# Patient Record
Sex: MALE | Race: WHITE | NOT HISPANIC OR LATINO | URBAN - METROPOLITAN AREA
[De-identification: names, ages, dates, MRNs, and addresses within clinical notes are randomized per-mention and may not be internally consistent; named-entity substitution may affect disease eponyms.]

---

## 2017-02-08 ENCOUNTER — EMERGENCY (EMERGENCY)
Facility: HOSPITAL | Age: 72
LOS: 0 days | Discharge: NURSING, REHAB OR LONG-TERM CARE | End: 2017-02-09

## 2017-03-16 ENCOUNTER — EMERGENCY (EMERGENCY)
Facility: HOSPITAL | Age: 72
LOS: 0 days | Discharge: NURSING, REHAB OR LONG-TERM CARE | End: 2017-03-16

## 2017-06-27 DIAGNOSIS — Z88.0 ALLERGY STATUS TO PENICILLIN: ICD-10-CM

## 2017-06-27 DIAGNOSIS — F32.9 MAJOR DEPRESSIVE DISORDER, SINGLE EPISODE, UNSPECIFIED: ICD-10-CM

## 2017-06-27 DIAGNOSIS — F20.9 SCHIZOPHRENIA, UNSPECIFIED: ICD-10-CM

## 2017-06-27 DIAGNOSIS — E78.00 PURE HYPERCHOLESTEROLEMIA, UNSPECIFIED: ICD-10-CM

## 2017-06-27 DIAGNOSIS — F22 DELUSIONAL DISORDERS: ICD-10-CM

## 2017-06-27 DIAGNOSIS — Z87.891 PERSONAL HISTORY OF NICOTINE DEPENDENCE: ICD-10-CM

## 2017-06-27 DIAGNOSIS — Z79.899 OTHER LONG TERM (CURRENT) DRUG THERAPY: ICD-10-CM

## 2017-06-27 DIAGNOSIS — K21.9 GASTRO-ESOPHAGEAL REFLUX DISEASE WITHOUT ESOPHAGITIS: ICD-10-CM

## 2017-07-18 ENCOUNTER — EMERGENCY (EMERGENCY)
Facility: HOSPITAL | Age: 72
LOS: 0 days | Discharge: NURSING, REHAB OR LONG-TERM CARE | End: 2017-07-18
Admitting: INTERNAL MEDICINE

## 2017-07-18 DIAGNOSIS — M25.551 PAIN IN RIGHT HIP: ICD-10-CM

## 2017-07-18 DIAGNOSIS — Z79.82 LONG TERM (CURRENT) USE OF ASPIRIN: ICD-10-CM

## 2017-07-18 DIAGNOSIS — F20.5 RESIDUAL SCHIZOPHRENIA: ICD-10-CM

## 2017-07-18 DIAGNOSIS — Z87.891 PERSONAL HISTORY OF NICOTINE DEPENDENCE: ICD-10-CM

## 2017-07-18 DIAGNOSIS — M25.511 PAIN IN RIGHT SHOULDER: ICD-10-CM

## 2017-07-18 DIAGNOSIS — K21.9 GASTRO-ESOPHAGEAL REFLUX DISEASE WITHOUT ESOPHAGITIS: ICD-10-CM
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
## 2017-07-18 DIAGNOSIS — Z88.0 ALLERGY STATUS TO PENICILLIN: ICD-10-CM
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
## 2017-07-18 DIAGNOSIS — L03.119 CELLULITIS OF UNSPECIFIED PART OF LIMB: ICD-10-CM
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
## 2017-07-18 DIAGNOSIS — E78.00 PURE HYPERCHOLESTEROLEMIA, UNSPECIFIED: ICD-10-CM

## 2017-07-18 DIAGNOSIS — G89.29 OTHER CHRONIC PAIN: ICD-10-CM

## 2017-07-18 DIAGNOSIS — F32.9 MAJOR DEPRESSIVE DISORDER, SINGLE EPISODE, UNSPECIFIED: ICD-10-CM

## 2017-08-05 ENCOUNTER — EMERGENCY (EMERGENCY)
Facility: HOSPITAL | Age: 72
LOS: 0 days | Discharge: HOME | End: 2017-08-05
Admitting: INTERNAL MEDICINE

## 2017-08-05 DIAGNOSIS — F20.5 RESIDUAL SCHIZOPHRENIA: ICD-10-CM

## 2017-08-05 DIAGNOSIS — L03.119 CELLULITIS OF UNSPECIFIED PART OF LIMB: ICD-10-CM

## 2017-08-05 DIAGNOSIS — Z02.9 ENCOUNTER FOR ADMINISTRATIVE EXAMINATIONS, UNSPECIFIED: ICD-10-CM

## 2017-08-06 ENCOUNTER — INPATIENT (INPATIENT)
Facility: HOSPITAL | Age: 72
LOS: 7 days | Discharge: NURSING, REHAB OR LONG-TERM CARE | End: 2017-08-14
Attending: INTERNAL MEDICINE | Admitting: INTERNAL MEDICINE

## 2017-08-06 DIAGNOSIS — L03.119 CELLULITIS OF UNSPECIFIED PART OF LIMB: ICD-10-CM

## 2017-08-06 DIAGNOSIS — F20.5 RESIDUAL SCHIZOPHRENIA: ICD-10-CM

## 2017-08-14 PROBLEM — Z00.00 ENCOUNTER FOR PREVENTIVE HEALTH EXAMINATION: Status: ACTIVE | Noted: 2017-08-14

## 2017-08-16 DIAGNOSIS — G40.802 OTHER EPILEPSY, NOT INTRACTABLE, WITHOUT STATUS EPILEPTICUS: ICD-10-CM

## 2017-08-16 DIAGNOSIS — F20.9 SCHIZOPHRENIA, UNSPECIFIED: ICD-10-CM

## 2017-08-16 DIAGNOSIS — J44.9 CHRONIC OBSTRUCTIVE PULMONARY DISEASE, UNSPECIFIED: ICD-10-CM

## 2017-08-16 DIAGNOSIS — E78.5 HYPERLIPIDEMIA, UNSPECIFIED: ICD-10-CM

## 2017-08-16 DIAGNOSIS — Z98.49 CATARACT EXTRACTION STATUS, UNSPECIFIED EYE: ICD-10-CM

## 2017-08-16 DIAGNOSIS — H40.9 UNSPECIFIED GLAUCOMA: ICD-10-CM

## 2017-08-16 DIAGNOSIS — Z88.0 ALLERGY STATUS TO PENICILLIN: ICD-10-CM

## 2017-08-16 DIAGNOSIS — K21.9 GASTRO-ESOPHAGEAL REFLUX DISEASE WITHOUT ESOPHAGITIS: ICD-10-CM

## 2017-08-16 DIAGNOSIS — J69.0 PNEUMONITIS DUE TO INHALATION OF FOOD AND VOMIT: ICD-10-CM

## 2017-08-16 DIAGNOSIS — E87.6 HYPOKALEMIA: ICD-10-CM

## 2017-08-16 DIAGNOSIS — J44.0 CHRONIC OBSTRUCTIVE PULMONARY DISEASE WITH ACUTE LOWER RESPIRATORY INFECTION: ICD-10-CM

## 2017-08-16 DIAGNOSIS — Z88.6 ALLERGY STATUS TO ANALGESIC AGENT: ICD-10-CM

## 2017-08-16 DIAGNOSIS — N32.81 OVERACTIVE BLADDER: ICD-10-CM

## 2017-08-16 DIAGNOSIS — R27.0 ATAXIA, UNSPECIFIED: ICD-10-CM

## 2017-08-16 DIAGNOSIS — Z72.0 TOBACCO USE: ICD-10-CM

## 2017-08-16 DIAGNOSIS — Z51.5 ENCOUNTER FOR PALLIATIVE CARE: ICD-10-CM

## 2017-08-16 DIAGNOSIS — Z88.8 ALLERGY STATUS TO OTHER DRUGS, MEDICAMENTS AND BIOLOGICAL SUBSTANCES STATUS: ICD-10-CM

## 2017-08-29 DIAGNOSIS — J44.0 CHRONIC OBSTRUCTIVE PULMONARY DISEASE WITH (ACUTE) LOWER RESPIRATORY INFECTION: ICD-10-CM

## 2017-08-29 DIAGNOSIS — J44.1 CHRONIC OBSTRUCTIVE PULMONARY DISEASE WITH (ACUTE) EXACERBATION: ICD-10-CM

## 2017-08-30 ENCOUNTER — EMERGENCY (EMERGENCY)
Facility: HOSPITAL | Age: 72
LOS: 0 days | Discharge: NURSING, REHAB OR LONG-TERM CARE | End: 2017-08-30
Admitting: INTERNAL MEDICINE

## 2017-08-30 DIAGNOSIS — F20.9 SCHIZOPHRENIA, UNSPECIFIED: ICD-10-CM

## 2017-08-30 DIAGNOSIS — R60.0 LOCALIZED EDEMA: ICD-10-CM

## 2017-08-30 DIAGNOSIS — I10 ESSENTIAL (PRIMARY) HYPERTENSION: ICD-10-CM

## 2017-08-30 DIAGNOSIS — F32.9 MAJOR DEPRESSIVE DISORDER, SINGLE EPISODE, UNSPECIFIED: ICD-10-CM

## 2017-08-30 DIAGNOSIS — Z88.0 ALLERGY STATUS TO PENICILLIN: ICD-10-CM

## 2017-08-30 DIAGNOSIS — F20.5 RESIDUAL SCHIZOPHRENIA: ICD-10-CM

## 2017-08-30 DIAGNOSIS — R56.9 UNSPECIFIED CONVULSIONS: ICD-10-CM

## 2017-08-30 DIAGNOSIS — Z79.899 OTHER LONG TERM (CURRENT) DRUG THERAPY: ICD-10-CM

## 2017-08-30 DIAGNOSIS — E78.00 PURE HYPERCHOLESTEROLEMIA, UNSPECIFIED: ICD-10-CM

## 2017-08-30 DIAGNOSIS — G40.909 EPILEPSY, UNSPECIFIED, NOT INTRACTABLE, WITHOUT STATUS EPILEPTICUS: ICD-10-CM

## 2017-08-30 DIAGNOSIS — N40.0 BENIGN PROSTATIC HYPERPLASIA WITHOUT LOWER URINARY TRACT SYMPTOMS: ICD-10-CM

## 2017-08-30 DIAGNOSIS — K21.9 GASTRO-ESOPHAGEAL REFLUX DISEASE WITHOUT ESOPHAGITIS: ICD-10-CM

## 2017-08-30 DIAGNOSIS — J45.909 UNSPECIFIED ASTHMA, UNCOMPLICATED: ICD-10-CM

## 2017-08-30 DIAGNOSIS — Z79.82 LONG TERM (CURRENT) USE OF ASPIRIN: ICD-10-CM

## 2017-08-30 DIAGNOSIS — L03.119 CELLULITIS OF UNSPECIFIED PART OF LIMB: ICD-10-CM

## 2017-08-30 DIAGNOSIS — R45.1 RESTLESSNESS AND AGITATION: ICD-10-CM

## 2017-10-06 ENCOUNTER — EMERGENCY (EMERGENCY)
Facility: HOSPITAL | Age: 72
LOS: 0 days | Discharge: NURSING, REHAB OR LONG-TERM CARE | End: 2017-10-06
Admitting: INTERNAL MEDICINE

## 2017-10-06 DIAGNOSIS — F32.9 MAJOR DEPRESSIVE DISORDER, SINGLE EPISODE, UNSPECIFIED: ICD-10-CM

## 2017-10-06 DIAGNOSIS — F20.5 RESIDUAL SCHIZOPHRENIA: ICD-10-CM

## 2017-10-06 DIAGNOSIS — M16.11 UNILATERAL PRIMARY OSTEOARTHRITIS, RIGHT HIP: ICD-10-CM

## 2017-10-06 DIAGNOSIS — Z79.82 LONG TERM (CURRENT) USE OF ASPIRIN: ICD-10-CM

## 2017-10-06 DIAGNOSIS — E78.00 PURE HYPERCHOLESTEROLEMIA, UNSPECIFIED: ICD-10-CM

## 2017-10-06 DIAGNOSIS — L03.119 CELLULITIS OF UNSPECIFIED PART OF LIMB: ICD-10-CM

## 2017-10-06 DIAGNOSIS — Z79.891 LONG TERM (CURRENT) USE OF OPIATE ANALGESIC: ICD-10-CM

## 2017-10-06 DIAGNOSIS — Z79.899 OTHER LONG TERM (CURRENT) DRUG THERAPY: ICD-10-CM

## 2017-10-06 DIAGNOSIS — K21.9 GASTRO-ESOPHAGEAL REFLUX DISEASE WITHOUT ESOPHAGITIS: ICD-10-CM

## 2017-10-06 DIAGNOSIS — Z88.0 ALLERGY STATUS TO PENICILLIN: ICD-10-CM

## 2017-10-06 DIAGNOSIS — M79.604 PAIN IN RIGHT LEG: ICD-10-CM

## 2017-12-21 ENCOUNTER — EMERGENCY (EMERGENCY)
Facility: HOSPITAL | Age: 72
LOS: 0 days | Discharge: NURSING, REHAB OR LONG-TERM CARE | End: 2017-12-21

## 2017-12-21 DIAGNOSIS — Z79.899 OTHER LONG TERM (CURRENT) DRUG THERAPY: ICD-10-CM

## 2017-12-21 DIAGNOSIS — J45.909 UNSPECIFIED ASTHMA, UNCOMPLICATED: ICD-10-CM

## 2017-12-21 DIAGNOSIS — K21.9 GASTRO-ESOPHAGEAL REFLUX DISEASE WITHOUT ESOPHAGITIS: ICD-10-CM

## 2017-12-21 DIAGNOSIS — Z79.82 LONG TERM (CURRENT) USE OF ASPIRIN: ICD-10-CM

## 2017-12-21 DIAGNOSIS — I10 ESSENTIAL (PRIMARY) HYPERTENSION: ICD-10-CM

## 2017-12-21 DIAGNOSIS — Z87.891 PERSONAL HISTORY OF NICOTINE DEPENDENCE: ICD-10-CM

## 2017-12-21 DIAGNOSIS — R45.1 RESTLESSNESS AND AGITATION: ICD-10-CM

## 2017-12-21 DIAGNOSIS — F20.5 RESIDUAL SCHIZOPHRENIA: ICD-10-CM

## 2017-12-21 DIAGNOSIS — E78.00 PURE HYPERCHOLESTEROLEMIA, UNSPECIFIED: ICD-10-CM

## 2017-12-21 DIAGNOSIS — F41.8 OTHER SPECIFIED ANXIETY DISORDERS: ICD-10-CM

## 2017-12-21 DIAGNOSIS — N40.0 BENIGN PROSTATIC HYPERPLASIA WITHOUT LOWER URINARY TRACT SYMPTOMS: ICD-10-CM

## 2017-12-21 DIAGNOSIS — L03.119 CELLULITIS OF UNSPECIFIED PART OF LIMB: ICD-10-CM

## 2017-12-21 DIAGNOSIS — Z88.0 ALLERGY STATUS TO PENICILLIN: ICD-10-CM

## 2018-02-07 ENCOUNTER — EMERGENCY (EMERGENCY)
Facility: HOSPITAL | Age: 73
LOS: 0 days | Discharge: NURSING, REHAB OR LONG-TERM CARE | End: 2018-02-07
Attending: EMERGENCY MEDICINE | Admitting: INTERNAL MEDICINE

## 2018-02-07 VITALS
DIASTOLIC BLOOD PRESSURE: 84 MMHG | SYSTOLIC BLOOD PRESSURE: 145 MMHG | TEMPERATURE: 96 F | HEART RATE: 77 BPM | RESPIRATION RATE: 17 BRPM | OXYGEN SATURATION: 98 %

## 2018-02-07 VITALS
HEART RATE: 77 BPM | OXYGEN SATURATION: 99 % | SYSTOLIC BLOOD PRESSURE: 151 MMHG | WEIGHT: 151.02 LBS | RESPIRATION RATE: 18 BRPM | DIASTOLIC BLOOD PRESSURE: 80 MMHG | TEMPERATURE: 98 F

## 2018-02-07 DIAGNOSIS — Z88.8 ALLERGY STATUS TO OTHER DRUGS, MEDICAMENTS AND BIOLOGICAL SUBSTANCES STATUS: ICD-10-CM

## 2018-02-07 DIAGNOSIS — Z88.6 ALLERGY STATUS TO ANALGESIC AGENT: ICD-10-CM
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
## 2018-02-07 DIAGNOSIS — Z88.0 ALLERGY STATUS TO PENICILLIN: ICD-10-CM

## 2018-02-07 DIAGNOSIS — M79.651 PAIN IN RIGHT THIGH: ICD-10-CM
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
## 2018-02-07 DIAGNOSIS — F17.210 NICOTINE DEPENDENCE, CIGARETTES, UNCOMPLICATED: ICD-10-CM
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
## 2018-02-07 NOTE — ED PROVIDER NOTE - PHYSICAL EXAMINATION
Physical Exam    Vital Signs: I have reviewed the initial vital signs.  Constitutional: well-nourished, appears stated age, no acute distress  Skin: warm, dry, no rash, redness or ecchymosis  Muskuloskeletal: +pain to palpation right thigh. NT to hip or knee. Patient ambulatory in ED. No swelling noted  Neuro: AO, Motor 5/5, Sensation: equal and intact
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
## 2018-02-07 NOTE — ED ADULT TRIAGE NOTE - CHIEF COMPLAINT QUOTE
Jose Manuel HESS from [facility]'s [facility] for right knee pain

## 2018-02-07 NOTE — ED ADULT NURSE NOTE - CHIEF COMPLAINT QUOTE
Jose Manuel HESS from [facility]'s [facility] for right knee pain

## 2018-07-04 ENCOUNTER — INPATIENT (INPATIENT)
Facility: HOSPITAL | Age: 73
LOS: 5 days | Discharge: NURSING, REHAB OR LONG-TERM CARE | End: 2018-07-10
Attending: INTERNAL MEDICINE | Admitting: INTERNAL MEDICINE

## 2018-07-04 VITALS
HEART RATE: 89 BPM | WEIGHT: 147.93 LBS | DIASTOLIC BLOOD PRESSURE: 76 MMHG | RESPIRATION RATE: 20 BRPM | OXYGEN SATURATION: 96 % | SYSTOLIC BLOOD PRESSURE: 137 MMHG

## 2018-07-04 LAB
ALBUMIN SERPL ELPH-MCNC: 4.7 G/DL — SIGNIFICANT CHANGE UP (ref 3.5–5.2)
ALP SERPL-CCNC: 171 U/L — HIGH (ref 30–115)
ALT FLD-CCNC: 12 U/L — SIGNIFICANT CHANGE UP (ref 0–41)
AMMONIA BLD-MCNC: 29 UMOL/L — SIGNIFICANT CHANGE UP (ref 11–55)
ANION GAP SERPL CALC-SCNC: 17 MMOL/L — HIGH (ref 7–14)
APPEARANCE UR: CLEAR — SIGNIFICANT CHANGE UP
APTT BLD: 29.1 SEC — SIGNIFICANT CHANGE UP (ref 27–39.2)
AST SERPL-CCNC: 23 U/L — SIGNIFICANT CHANGE UP (ref 0–41)
BASOPHILS # BLD AUTO: 0.03 K/UL — SIGNIFICANT CHANGE UP (ref 0–0.2)
BASOPHILS NFR BLD AUTO: 0.5 % — SIGNIFICANT CHANGE UP (ref 0–1)
BILIRUB SERPL-MCNC: 0.5 MG/DL — SIGNIFICANT CHANGE UP (ref 0.2–1.2)
BILIRUB UR-MCNC: NEGATIVE — SIGNIFICANT CHANGE UP
BUN SERPL-MCNC: 27 MG/DL — HIGH (ref 10–20)
CALCIUM SERPL-MCNC: 9.6 MG/DL — SIGNIFICANT CHANGE UP (ref 8.5–10.1)
CHLORIDE SERPL-SCNC: 100 MMOL/L — SIGNIFICANT CHANGE UP (ref 98–110)
CK SERPL-CCNC: 193 U/L — SIGNIFICANT CHANGE UP (ref 0–225)
CO2 SERPL-SCNC: 23 MMOL/L — SIGNIFICANT CHANGE UP (ref 17–32)
COLOR SPEC: YELLOW — SIGNIFICANT CHANGE UP
CREAT SERPL-MCNC: 1 MG/DL — SIGNIFICANT CHANGE UP (ref 0.7–1.5)
DIFF PNL FLD: (no result)
EOSINOPHIL # BLD AUTO: 0.06 K/UL — SIGNIFICANT CHANGE UP (ref 0–0.7)
EOSINOPHIL NFR BLD AUTO: 1 % — SIGNIFICANT CHANGE UP (ref 0–8)
GLUCOSE SERPL-MCNC: 84 MG/DL — SIGNIFICANT CHANGE UP (ref 70–99)
GLUCOSE UR QL: NEGATIVE — SIGNIFICANT CHANGE UP
HCT VFR BLD CALC: 39.8 % — LOW (ref 42–52)
HGB BLD-MCNC: 13.1 G/DL — LOW (ref 14–18)
IMM GRANULOCYTES NFR BLD AUTO: 0.2 % — SIGNIFICANT CHANGE UP (ref 0.1–0.3)
INR BLD: 1.24 RATIO — SIGNIFICANT CHANGE UP (ref 0.65–1.3)
KETONES UR-MCNC: 80 — SIGNIFICANT CHANGE UP
LACTATE SERPL-SCNC: 1.5 MMOL/L — SIGNIFICANT CHANGE UP (ref 0.5–2.2)
LEUKOCYTE ESTERASE UR-ACNC: NEGATIVE — SIGNIFICANT CHANGE UP
LYMPHOCYTES # BLD AUTO: 1.94 K/UL — SIGNIFICANT CHANGE UP (ref 1.2–3.4)
LYMPHOCYTES # BLD AUTO: 33.5 % — SIGNIFICANT CHANGE UP (ref 20.5–51.1)
MAGNESIUM SERPL-MCNC: 2.2 MG/DL — SIGNIFICANT CHANGE UP (ref 1.8–2.4)
MCHC RBC-ENTMCNC: 29.3 PG — SIGNIFICANT CHANGE UP (ref 27–31)
MCHC RBC-ENTMCNC: 32.9 G/DL — SIGNIFICANT CHANGE UP (ref 32–37)
MCV RBC AUTO: 89 FL — SIGNIFICANT CHANGE UP (ref 80–94)
MONOCYTES # BLD AUTO: 0.49 K/UL — SIGNIFICANT CHANGE UP (ref 0.1–0.6)
MONOCYTES NFR BLD AUTO: 8.5 % — SIGNIFICANT CHANGE UP (ref 1.7–9.3)
NEUTROPHILS # BLD AUTO: 3.26 K/UL — SIGNIFICANT CHANGE UP (ref 1.4–6.5)
NEUTROPHILS NFR BLD AUTO: 56.3 % — SIGNIFICANT CHANGE UP (ref 42.2–75.2)
NITRITE UR-MCNC: NEGATIVE — SIGNIFICANT CHANGE UP
NRBC # BLD: 0 /100 WBCS — SIGNIFICANT CHANGE UP (ref 0–0)
PH UR: 5.5 — SIGNIFICANT CHANGE UP (ref 5–8)
PLATELET # BLD AUTO: 229 K/UL — SIGNIFICANT CHANGE UP (ref 130–400)
POTASSIUM SERPL-MCNC: 4.4 MMOL/L — SIGNIFICANT CHANGE UP (ref 3.5–5)
POTASSIUM SERPL-SCNC: 4.4 MMOL/L — SIGNIFICANT CHANGE UP (ref 3.5–5)
PROT SERPL-MCNC: 8.1 G/DL — HIGH (ref 6–8)
PROT UR-MCNC: NEGATIVE — SIGNIFICANT CHANGE UP
PROTHROM AB SERPL-ACNC: 13.4 SEC — HIGH (ref 9.95–12.87)
RBC # BLD: 4.47 M/UL — LOW (ref 4.7–6.1)
RBC # FLD: 14.7 % — HIGH (ref 11.5–14.5)
RBC CASTS # UR COMP ASSIST: SIGNIFICANT CHANGE UP /HPF
SODIUM SERPL-SCNC: 140 MMOL/L — SIGNIFICANT CHANGE UP (ref 135–146)
SP GR SPEC: >=1.03 (ref 1.01–1.03)
TROPONIN T SERPL-MCNC: <0.01 NG/ML — SIGNIFICANT CHANGE UP
UROBILINOGEN FLD QL: 0.2 — SIGNIFICANT CHANGE UP (ref 0.2–0.2)
WBC # BLD: 5.79 K/UL — SIGNIFICANT CHANGE UP (ref 4.8–10.8)
WBC # FLD AUTO: 5.79 K/UL — SIGNIFICANT CHANGE UP (ref 4.8–10.8)
WBC UR QL: SIGNIFICANT CHANGE UP /HPF

## 2018-07-04 RX ORDER — LEVETIRACETAM 250 MG/1
1 TABLET, FILM COATED ORAL
Qty: 0 | Refills: 0 | COMMUNITY

## 2018-07-04 RX ORDER — TAMSULOSIN HYDROCHLORIDE 0.4 MG/1
0.4 CAPSULE ORAL AT BEDTIME
Qty: 0 | Refills: 0 | Status: DISCONTINUED | OUTPATIENT
Start: 2018-07-04 | End: 2018-07-10

## 2018-07-04 RX ORDER — MIRTAZAPINE 45 MG/1
1 TABLET, ORALLY DISINTEGRATING ORAL
Qty: 0 | Refills: 0 | COMMUNITY

## 2018-07-04 RX ORDER — TAMSULOSIN HYDROCHLORIDE 0.4 MG/1
1 CAPSULE ORAL
Qty: 0 | Refills: 0 | COMMUNITY

## 2018-07-04 RX ORDER — HALOPERIDOL DECANOATE 100 MG/ML
5 INJECTION INTRAMUSCULAR ONCE
Qty: 0 | Refills: 0 | Status: COMPLETED | OUTPATIENT
Start: 2018-07-04 | End: 2018-07-04

## 2018-07-04 RX ORDER — DIVALPROEX SODIUM 500 MG/1
500 TABLET, DELAYED RELEASE ORAL DAILY
Qty: 0 | Refills: 0 | Status: DISCONTINUED | OUTPATIENT
Start: 2018-07-04 | End: 2018-07-10

## 2018-07-04 RX ORDER — LEVETIRACETAM 250 MG/1
500 TABLET, FILM COATED ORAL
Qty: 0 | Refills: 0 | Status: DISCONTINUED | OUTPATIENT
Start: 2018-07-04 | End: 2018-07-10

## 2018-07-04 RX ORDER — DIVALPROEX SODIUM 500 MG/1
0 TABLET, DELAYED RELEASE ORAL
Qty: 0 | Refills: 0 | COMMUNITY

## 2018-07-04 RX ORDER — SODIUM CHLORIDE 9 MG/ML
3 INJECTION INTRAMUSCULAR; INTRAVENOUS; SUBCUTANEOUS EVERY 8 HOURS
Qty: 0 | Refills: 0 | Status: DISCONTINUED | OUTPATIENT
Start: 2018-07-04 | End: 2018-07-10

## 2018-07-04 RX ORDER — ACETAMINOPHEN 500 MG
650 TABLET ORAL ONCE
Qty: 0 | Refills: 0 | Status: DISCONTINUED | OUTPATIENT
Start: 2018-07-04 | End: 2018-07-04

## 2018-07-04 RX ORDER — ATORVASTATIN CALCIUM 80 MG/1
20 TABLET, FILM COATED ORAL AT BEDTIME
Qty: 0 | Refills: 0 | Status: DISCONTINUED | OUTPATIENT
Start: 2018-07-04 | End: 2018-07-10

## 2018-07-04 RX ORDER — ATORVASTATIN CALCIUM 80 MG/1
1 TABLET, FILM COATED ORAL
Qty: 0 | Refills: 0 | COMMUNITY

## 2018-07-04 RX ORDER — HALOPERIDOL DECANOATE 100 MG/ML
0 INJECTION INTRAMUSCULAR
Qty: 0 | Refills: 0 | COMMUNITY

## 2018-07-04 RX ORDER — PREGABALIN 225 MG/1
1000 CAPSULE ORAL ONCE
Qty: 0 | Refills: 0 | Status: COMPLETED | OUTPATIENT
Start: 2018-07-04 | End: 2018-07-04

## 2018-07-04 RX ORDER — PREGABALIN 225 MG/1
1000 CAPSULE ORAL ONCE
Qty: 0 | Refills: 0 | Status: DISCONTINUED | OUTPATIENT
Start: 2018-07-04 | End: 2018-07-04

## 2018-07-04 RX ORDER — MIRTAZAPINE 45 MG/1
15 TABLET, ORALLY DISINTEGRATING ORAL AT BEDTIME
Qty: 0 | Refills: 0 | Status: DISCONTINUED | OUTPATIENT
Start: 2018-07-04 | End: 2018-07-10

## 2018-07-04 RX ADMIN — TAMSULOSIN HYDROCHLORIDE 0.4 MILLIGRAM(S): 0.4 CAPSULE ORAL at 21:20

## 2018-07-04 RX ADMIN — MIRTAZAPINE 15 MILLIGRAM(S): 45 TABLET, ORALLY DISINTEGRATING ORAL at 21:20

## 2018-07-04 RX ADMIN — LEVETIRACETAM 500 MILLIGRAM(S): 250 TABLET, FILM COATED ORAL at 17:52

## 2018-07-04 RX ADMIN — PREGABALIN 1000 MICROGRAM(S): 225 CAPSULE ORAL at 16:41

## 2018-07-04 RX ADMIN — SODIUM CHLORIDE 3 MILLILITER(S): 9 INJECTION INTRAMUSCULAR; INTRAVENOUS; SUBCUTANEOUS at 21:19

## 2018-07-04 RX ADMIN — SODIUM CHLORIDE 3 MILLILITER(S): 9 INJECTION INTRAMUSCULAR; INTRAVENOUS; SUBCUTANEOUS at 17:05

## 2018-07-04 RX ADMIN — ATORVASTATIN CALCIUM 20 MILLIGRAM(S): 80 TABLET, FILM COATED ORAL at 21:20

## 2018-07-04 RX ADMIN — HALOPERIDOL DECANOATE 5 MILLIGRAM(S): 100 INJECTION INTRAMUSCULAR at 09:28

## 2018-07-04 NOTE — H&P ADULT - HISTORY OF PRESENT ILLNESS
73y old male pmhx below presents to the ED as per adult home for sudden onset of change in mental status. pt was found awake all night, walking about and confused. pt is confused unable to get further history.

## 2018-07-04 NOTE — H&P ADULT - NSHPPHYSICALEXAM_GEN_ALL_CORE
PHYSICAL EXAM:  GENERAL: alert, confused  HEAD:  Atraumatic, Normocephalic  EYES: EOMI, PERRLA, conjunctiva and sclera clear  NECK: Supple, No JVD  CHEST/LUNG: Clear to auscultation bilaterally  HEART: S1,S2 Regular rate and rhythm  ABDOMEN: Soft, nontender, nondistended, Bowel sounds present and normoactive  EXTREMITIES:  2+ peripheral pulses bilaterally and symmetrically, no clubbing, cyanosis, or edema  PSYCH: ALERT, confused  NEUROLOGY: non-focal, muscle strength 5/5 all extremities  SKIN: No rashes or lesions

## 2018-07-04 NOTE — ED ADULT NURSE NOTE - CHIEF COMPLAINT QUOTE
Pt from [facility] last night was walking aroundnaked and had jibberish speech. Pt usually speaks normally

## 2018-07-04 NOTE — ED ADULT TRIAGE NOTE - CHIEF COMPLAINT QUOTE
Pt from [facility] last night was walking aroundnaked and had jibberish speech. Pt usually speaks normally

## 2018-07-04 NOTE — ED PROVIDER NOTE - ATTENDING CONTRIBUTION TO CARE
73yr old male hx of schizophrenia, epilepsy, glaucoma, b12def, here for eval of chance in mental status. pt is resident of [facility], normally independently functional, noticed last night to be walking around naked, muttering incoherently. additionally as per [facility], yesterday by pt roomate noted not to be himself. pt on exam follows commands, vocalizing sounds not words, pt moves all ext with equal strength, right eye reative, left eye irregular pupil, poorly reactive (likely from cataract), neck supple, s1s2 ctab soft nt/nd. impression change in functional status, afebrile, no wbc, neck supplem, following commands, pt became more calm with haldol, seen by psych. plan to discuss with neuro admit to hospital for further workup and eval 73yr old male hx of schizophrenia, epilepsy, glaucoma, b12def, here for eval of chance in mental status. pt is resident of [facility], normally independently functional, noticed last night to be walking around naked, muttering incoherently. additionally as per [facility], yesterday by pt roomate noted not to be himself. pt on exam follows commands, vocalizing sounds not words, pt moves all ext with equal strength, right eye reative, left eye irregular pupil, poorly reactive (likely from cataract), neck supple, s1s2 ctab soft nt/nd. impression change in functional status, afebrile, no wbc, neck supple, following commands, pt became more calm with haldol, seen by psych. plan to discuss with neuro admit to hospital for further workup and eval

## 2018-07-04 NOTE — ED ADULT NURSE NOTE - PMH
Asthma    BPH (benign prostatic hyperplasia)    Constipation    COPD (chronic obstructive pulmonary disease)    Glaucoma    Hypertension    Overactive bladder    Schizophrenia    Seizure

## 2018-07-04 NOTE — H&P ADULT - NSHPLABSRESULTS_GEN_ALL_CORE
13.1   5.79  )-----------( 229      ( 04 Jul 2018 09:55 )             39.8       07-04    140  |  100  |  27<H>  ----------------------------<  84  4.4   |  23  |  1.0    Ca    9.6      04 Jul 2018 09:55  Mg     2.2     07-04    TPro  8.1<H>  /  Alb  4.7  /  TBili  0.5  /  DBili  x   /  AST  23  /  ALT  12  /  AlkPhos  171<H>  07-04          Magnesium, Serum: 2.2 mg/dL (07-04-18 @ 09:55)          Urinalysis Basic - ( 04 Jul 2018 12:17 )    Color: Yellow / Appearance: Clear / SG: >=1.030 / pH: x  Gluc: x / Ketone: 80  / Bili: Negative / Urobili: 0.2   Blood: x / Protein: Negative / Nitrite: Negative   Leuk Esterase: Negative / RBC: 1-2 /HPF / WBC 1-2 /HPF   Sq Epi: x / Non Sq Epi: x / Bacteria: x        PT/INR - ( 04 Jul 2018 09:55 )   PT: 13.40 sec;   INR: 1.24 ratio         PTT - ( 04 Jul 2018 09:55 )  PTT:29.1 sec    Lactate Trend  07-04 @ 09:56 Lactate:1.5       CARDIAC MARKERS ( 04 Jul 2018 09:55 )  x     / <0.01 ng/mL / 193 U/L / x     / 4.0 ng/mL

## 2018-07-04 NOTE — H&P ADULT - ATTENDING COMMENTS
Pt seen and examined independently, I have read and agree with above exam and poa,   PT is confused combative, aggressive and is cursing the staff    Physical Exam:  Physical Exam: PHYSICAL EXAM:  	GENERAL: alert, confused  	HEAD:  Atraumatic, Normocephalic  	EYES: EOMI, PERRLA, conjunctiva and sclera clear  	NECK: Supple, No JVD  	CHEST/LUNG: Clear to auscultation bilaterally  	HEART: S1,S2 Regular rate and rhythm  	ABDOMEN: Soft, nontender, nondistended, Bowel sounds present and normoactive  	EXTREMITIES:  2+ peripheral pulses bilaterally and symmetrically, no clubbing, cyanosis, or edema  	PSYCH: ALERT, confused  	NEUROLOGY: non-focal, muscle strength 5/5 all extremities confused,  SKIN: No rashes or lesions    · Assessment    DX; altred mental status/ metabolic encephalopathy  A/P; admit to med-surg  head CT  neurology consult  neuro checks  labs/ecg/c-xray  psychiatry consult  continue previous meds  seizure precautions  monitor vss  monitor p

## 2018-07-05 LAB
ANION GAP SERPL CALC-SCNC: 17 MMOL/L — HIGH (ref 7–14)
BUN SERPL-MCNC: 19 MG/DL — SIGNIFICANT CHANGE UP (ref 10–20)
CALCIUM SERPL-MCNC: 9.4 MG/DL — SIGNIFICANT CHANGE UP (ref 8.5–10.1)
CHLORIDE SERPL-SCNC: 101 MMOL/L — SIGNIFICANT CHANGE UP (ref 98–110)
CO2 SERPL-SCNC: 23 MMOL/L — SIGNIFICANT CHANGE UP (ref 17–32)
CREAT SERPL-MCNC: 0.9 MG/DL — SIGNIFICANT CHANGE UP (ref 0.7–1.5)
CULTURE RESULTS: NO GROWTH — SIGNIFICANT CHANGE UP
GLUCOSE SERPL-MCNC: 79 MG/DL — SIGNIFICANT CHANGE UP (ref 70–99)
HCT VFR BLD CALC: 43.2 % — SIGNIFICANT CHANGE UP (ref 42–52)
HGB BLD-MCNC: 14.1 G/DL — SIGNIFICANT CHANGE UP (ref 14–18)
MCHC RBC-ENTMCNC: 29 PG — SIGNIFICANT CHANGE UP (ref 27–31)
MCHC RBC-ENTMCNC: 32.6 G/DL — SIGNIFICANT CHANGE UP (ref 32–37)
MCV RBC AUTO: 88.9 FL — SIGNIFICANT CHANGE UP (ref 80–94)
NRBC # BLD: 0 /100 WBCS — SIGNIFICANT CHANGE UP (ref 0–0)
PLATELET # BLD AUTO: 213 K/UL — SIGNIFICANT CHANGE UP (ref 130–400)
POTASSIUM SERPL-MCNC: 4.7 MMOL/L — SIGNIFICANT CHANGE UP (ref 3.5–5)
POTASSIUM SERPL-SCNC: 4.7 MMOL/L — SIGNIFICANT CHANGE UP (ref 3.5–5)
RBC # BLD: 4.86 M/UL — SIGNIFICANT CHANGE UP (ref 4.7–6.1)
RBC # FLD: 14.4 % — SIGNIFICANT CHANGE UP (ref 11.5–14.5)
SODIUM SERPL-SCNC: 141 MMOL/L — SIGNIFICANT CHANGE UP (ref 135–146)
SPECIMEN SOURCE: SIGNIFICANT CHANGE UP
WBC # BLD: 5.71 K/UL — SIGNIFICANT CHANGE UP (ref 4.8–10.8)
WBC # FLD AUTO: 5.71 K/UL — SIGNIFICANT CHANGE UP (ref 4.8–10.8)

## 2018-07-05 RX ADMIN — ATORVASTATIN CALCIUM 20 MILLIGRAM(S): 80 TABLET, FILM COATED ORAL at 21:17

## 2018-07-05 RX ADMIN — DIVALPROEX SODIUM 500 MILLIGRAM(S): 500 TABLET, DELAYED RELEASE ORAL at 11:18

## 2018-07-05 RX ADMIN — TAMSULOSIN HYDROCHLORIDE 0.4 MILLIGRAM(S): 0.4 CAPSULE ORAL at 21:17

## 2018-07-05 RX ADMIN — Medication 1 TABLET(S): at 11:18

## 2018-07-05 RX ADMIN — LEVETIRACETAM 500 MILLIGRAM(S): 250 TABLET, FILM COATED ORAL at 05:32

## 2018-07-05 RX ADMIN — LEVETIRACETAM 500 MILLIGRAM(S): 250 TABLET, FILM COATED ORAL at 17:29

## 2018-07-05 RX ADMIN — SODIUM CHLORIDE 3 MILLILITER(S): 9 INJECTION INTRAMUSCULAR; INTRAVENOUS; SUBCUTANEOUS at 05:33

## 2018-07-05 RX ADMIN — MIRTAZAPINE 15 MILLIGRAM(S): 45 TABLET, ORALLY DISINTEGRATING ORAL at 21:17

## 2018-07-05 RX ADMIN — SODIUM CHLORIDE 3 MILLILITER(S): 9 INJECTION INTRAMUSCULAR; INTRAVENOUS; SUBCUTANEOUS at 13:25

## 2018-07-05 RX ADMIN — SODIUM CHLORIDE 3 MILLILITER(S): 9 INJECTION INTRAMUSCULAR; INTRAVENOUS; SUBCUTANEOUS at 21:16

## 2018-07-05 NOTE — PROGRESS NOTE BEHAVIORAL HEALTH - SUMMARY
72 yo M w ho schizophrenia, resident of adult home, had acute change of mental status- noted to be speaking unintelligable sounds which is not baseline. Baseline is normal speech, normal articulation. Changes noted above are acute change. Pt was also found walking around naked which is departure from baseline. There is no likely psychiatric explanation for these observed changes. Further neurological, medical work up indicated.   No need for IPP transfer.

## 2018-07-05 NOTE — PROGRESS NOTE BEHAVIORAL HEALTH - NSBHFUPINTERVALHXFT_PSY_A_CORE
Pt encountered laying comfortably in bed near partially eaten lunch. Pt noted to be self dialoguing upon approach. Pt alert, engages, makes good eye contact and seems to be attempting speech. Pt vocalizing and vocalizations seem to be attempts at words as sounds have inflections, marie, pauses of normal speech. However, sounds are unintelligable and pt does not seem to have understanding that he is unintelligable. Pt with neutral affect, seems comfortable, NAD.

## 2018-07-05 NOTE — CONSULT NOTE ADULT - SUBJECTIVE AND OBJECTIVE BOX
Neurology Consultation note    Name  ZEE VILLAGOMEZ    HPI:  73y old male pmhx below presents to the ED as per adult home for sudden onset of change in mental status. pt was found awake all night, walking about and confused. pt is confused unable to get further history. (04 Jul 2018 16:13)      NEURO:   Patient is a 74 yo man with hx of schizophrenia and sz d/o on keppra 500 q 12 presents with AMS  Unclear as to what his baseline is . patient unable to give me a hx     PMH  sthma    BPH (benign prostatic hyperplasia)    Constipation    COPD (chronic obstructive pulmonary disease)    Glaucoma    Hypertension    Overactive bladder    Schizophrenia    Seizure.          Vital Signs Last 24 Hrs  T(C): 35.7 (05 Jul 2018 05:49), Max: 35.9 (04 Jul 2018 22:00)  T(F): 96.3 (05 Jul 2018 05:49), Max: 96.6 (04 Jul 2018 22:00)  HR: 67 (05 Jul 2018 05:49) (67 - 85)  BP: 152/78 (05 Jul 2018 05:49) (113/83 - 161/96)  BP(mean): --  RR: 16 (05 Jul 2018 05:49) (16 - 18)  SpO2: 98% (04 Jul 2018 16:05) (98% - 98%)    Neurological Exam:   ms: awake and alert, oriented to person only  speech is unintelligible at times but can make fluent sentences and follows simple commands.   no insight into admission  moves all 4 extrem equally  eyes midline, pupils sluggish  cr nn grossly symmetric    Medications  atorvastatin 20 milliGRAM(s) Oral at bedtime  diVALproex  milliGRAM(s) Oral daily  levETIRAcetam 500 milliGRAM(s) Oral two times a day  mirtazapine 15 milliGRAM(s) Oral at bedtime  multivitamin 1 Tablet(s) Oral daily  sodium chloride 0.9% lock flush 3 milliLiter(s) IV Push every 8 hours  tamsulosin 0.4 milliGRAM(s) Oral at bedtime      Lab  07-05    141  |  101  |  19  ----------------------------<  79  4.7   |  23  |  0.9    Ca    9.4      05 Jul 2018 07:00  Mg     2.2     07-04    TPro  8.1<H>  /  Alb  4.7  /  TBili  0.5  /  DBili  x   /  AST  23  /  ALT  12  /  AlkPhos  171<H>  07-04                          14.1   5.71  )-----------( 213      ( 05 Jul 2018 07:00 )             43.2     LIVER FUNCTIONS - ( 04 Jul 2018 09:55 )  Alb: 4.7 g/dL / Pro: 8.1 g/dL / ALK PHOS: 171 U/L / ALT: 12 U/L / AST: 23 U/L / GGT: x                   Radiology  cth no acute changes      Assessment: 74 yo man with the aforementioned hx p/w ams  no gross focality on exam  baseline not clear    Plan:  given sz d/o, REEG  CONT KEPPRA  Q 12  WILL FOLLOW  PLEASE CALL WITH ANY QUESTIONS Neurology Consultation note    Name  ZEE VILLAGOMEZ    HPI:  73y old male pmhx below presents to the ED as per adult home for sudden onset of change in mental status. pt was found awake all night, walking about and confused. pt is confused unable to get further history. (04 Jul 2018 16:13)      NEURO:   Patient is a 74 yo man with hx of schizophrenia and sz d/o on keppra 500 q 12 presents with AMS  Unclear as to what his baseline is . patient unable to give me a hx     PMH  sthma    BPH (benign prostatic hyperplasia)    Constipation    COPD (chronic obstructive pulmonary disease)    Glaucoma    Hypertension    Overactive bladder    Schizophrenia    Seizure.          Vital Signs Last 24 Hrs  T(C): 35.7 (05 Jul 2018 05:49), Max: 35.9 (04 Jul 2018 22:00)  T(F): 96.3 (05 Jul 2018 05:49), Max: 96.6 (04 Jul 2018 22:00)  HR: 67 (05 Jul 2018 05:49) (67 - 85)  BP: 152/78 (05 Jul 2018 05:49) (113/83 - 161/96)  BP(mean): --  RR: 16 (05 Jul 2018 05:49) (16 - 18)  SpO2: 98% (04 Jul 2018 16:05) (98% - 98%)    Neurological Exam:   ms: awake and alert, oriented to person only  speech is unintelligible/dysarthric at times but is able make fluent sentences and follows simple commands. the content of his speech is altered. when i stop engaging him, he continues to speak to himself. at times the content is relevant and others it is not. appears to have appropriate prosody  no insight into admission  moves all 4 extrem equally  eyes midline, pupils sluggish  cr nn grossly symmetric    Medications  atorvastatin 20 milliGRAM(s) Oral at bedtime  diVALproex  milliGRAM(s) Oral daily  levETIRAcetam 500 milliGRAM(s) Oral two times a day  mirtazapine 15 milliGRAM(s) Oral at bedtime  multivitamin 1 Tablet(s) Oral daily  sodium chloride 0.9% lock flush 3 milliLiter(s) IV Push every 8 hours  tamsulosin 0.4 milliGRAM(s) Oral at bedtime      Lab  07-05    141  |  101  |  19  ----------------------------<  79  4.7   |  23  |  0.9    Ca    9.4      05 Jul 2018 07:00  Mg     2.2     07-04    TPro  8.1<H>  /  Alb  4.7  /  TBili  0.5  /  DBili  x   /  AST  23  /  ALT  12  /  AlkPhos  171<H>  07-04                          14.1   5.71  )-----------( 213      ( 05 Jul 2018 07:00 )             43.2     LIVER FUNCTIONS - ( 04 Jul 2018 09:55 )  Alb: 4.7 g/dL / Pro: 8.1 g/dL / ALK PHOS: 171 U/L / ALT: 12 U/L / AST: 23 U/L / GGT: x                   Radiology  cth no acute changes      Assessment: 74 yo man with the aforementioned hx p/w ams  no gross focality on exam  baseline not clear. ADDENDUM: PATIENT W/ NORMAL SPEECH AT BASELINE, THEREFORE THIS AN ACUTE CHANGE    Plan:  given sz d/o, REEG  CONT KEPPRA  Q 12  WILL FOLLOW  PLEASE CALL WITH ANY QUESTIONS  ADDENDUM: IF REEG IS NORMAL, WOULD RECOMMEND MRI BRAIN NC

## 2018-07-06 LAB
FOLATE SERPL-MCNC: 19.3 NG/ML — SIGNIFICANT CHANGE UP
TSH SERPL-MCNC: 2.02 UIU/ML — SIGNIFICANT CHANGE UP (ref 0.27–4.2)
VIT B12 SERPL-MCNC: >2000 PG/ML — HIGH (ref 232–1245)

## 2018-07-06 RX ADMIN — SODIUM CHLORIDE 3 MILLILITER(S): 9 INJECTION INTRAMUSCULAR; INTRAVENOUS; SUBCUTANEOUS at 14:03

## 2018-07-06 RX ADMIN — MIRTAZAPINE 15 MILLIGRAM(S): 45 TABLET, ORALLY DISINTEGRATING ORAL at 21:21

## 2018-07-06 RX ADMIN — Medication 1 TABLET(S): at 11:10

## 2018-07-06 RX ADMIN — LEVETIRACETAM 500 MILLIGRAM(S): 250 TABLET, FILM COATED ORAL at 17:26

## 2018-07-06 RX ADMIN — ATORVASTATIN CALCIUM 20 MILLIGRAM(S): 80 TABLET, FILM COATED ORAL at 21:21

## 2018-07-06 RX ADMIN — DIVALPROEX SODIUM 500 MILLIGRAM(S): 500 TABLET, DELAYED RELEASE ORAL at 11:10

## 2018-07-06 RX ADMIN — LEVETIRACETAM 500 MILLIGRAM(S): 250 TABLET, FILM COATED ORAL at 05:21

## 2018-07-06 RX ADMIN — TAMSULOSIN HYDROCHLORIDE 0.4 MILLIGRAM(S): 0.4 CAPSULE ORAL at 21:20

## 2018-07-06 NOTE — PROGRESS NOTE ADULT - SUBJECTIVE AND OBJECTIVE BOX
Patient was seen and examined. Spoke with RN. Chart reviewed.    No events overnight.  Vital Signs Last 24 Hrs  T(F): 96.5 (06 Jul 2018 05:36), Max: 96.5 (06 Jul 2018 05:36)  HR: 65 (06 Jul 2018 05:36) (65 - 80)  BP: 144/70 (06 Jul 2018 05:36) (129/60 - 146/72)  SpO2: --  MEDICATIONS  (STANDING):  atorvastatin 20 milliGRAM(s) Oral at bedtime  diVALproex  milliGRAM(s) Oral daily  levETIRAcetam 500 milliGRAM(s) Oral two times a day  mirtazapine 15 milliGRAM(s) Oral at bedtime  multivitamin 1 Tablet(s) Oral daily  sodium chloride 0.9% lock flush 3 milliLiter(s) IV Push every 8 hours  tamsulosin 0.4 milliGRAM(s) Oral at bedtime    MEDICATIONS  (PRN):    Labs:                        14.1   5.71  )-----------( 213      ( 05 Jul 2018 07:00 )             43.2     05 Jul 2018 07:00    141    |  101    |  19     ----------------------------<  79     4.7     |  23     |  0.9      Ca    9.4        05 Jul 2018 07:00        Urinalysis Basic - ( 04 Jul 2018 12:17 )    Color: Yellow / Appearance: Clear / SG: >=1.030 / pH: x  Gluc: x / Ketone: 80  / Bili: Negative / Urobili: 0.2   Blood: x / Protein: Negative / Nitrite: Negative   Leuk Esterase: Negative / RBC: 1-2 /HPF / WBC 1-2 /HPF   Sq Epi: x / Non Sq Epi: x / Bacteria: x        Culture - Urine (collected 04 Jul 2018 12:17)  Source: .Urine Catheterized  Final Report (05 Jul 2018 20:11):    No growth        Radiology:    General: comfortable, NAD  Neurology: A&Ox1  Head:  Normocephalic, atraumatic  ENT:  Mucosa moist, no ulcerations  Neck:  Supple, no JVD,   Skin: no breakdowns (as per RN)  Resp: CTA B/L  CV: RRR, S1S2,   GI: Soft, NT, bowel sounds  MS: No edema, + peripheral pulses, FROM all 4 extremity

## 2018-07-06 NOTE — PROGRESS NOTE ADULT - ASSESSMENT
DX; altred mental status/ metabolic encephalopathy  h/o psych/ seizures  A/P; admit to med-surg  head CT noted  neurology consult noted  neuro checks  labs/ecg/c-xray  psychiatry consult noted  continue previous meds  seizure precautions  monitor vss  neuro fu  consider mri

## 2018-07-07 DIAGNOSIS — F20.0 PARANOID SCHIZOPHRENIA: ICD-10-CM

## 2018-07-07 DIAGNOSIS — R41.0 DISORIENTATION, UNSPECIFIED: ICD-10-CM

## 2018-07-07 RX ADMIN — TAMSULOSIN HYDROCHLORIDE 0.4 MILLIGRAM(S): 0.4 CAPSULE ORAL at 21:55

## 2018-07-07 RX ADMIN — LEVETIRACETAM 500 MILLIGRAM(S): 250 TABLET, FILM COATED ORAL at 05:24

## 2018-07-07 RX ADMIN — Medication 1 TABLET(S): at 11:04

## 2018-07-07 RX ADMIN — DIVALPROEX SODIUM 500 MILLIGRAM(S): 500 TABLET, DELAYED RELEASE ORAL at 11:04

## 2018-07-07 RX ADMIN — LEVETIRACETAM 500 MILLIGRAM(S): 250 TABLET, FILM COATED ORAL at 17:23

## 2018-07-07 RX ADMIN — ATORVASTATIN CALCIUM 20 MILLIGRAM(S): 80 TABLET, FILM COATED ORAL at 21:55

## 2018-07-07 RX ADMIN — MIRTAZAPINE 15 MILLIGRAM(S): 45 TABLET, ORALLY DISINTEGRATING ORAL at 21:55

## 2018-07-07 NOTE — PROGRESS NOTE ADULT - SUBJECTIVE AND OBJECTIVE BOX
ZEE VILLAGOMEZ  73y  Male    Patient is a 73y old  Male who presents with a chief complaint of altered mental status (04 Jul 2018 16:13)    ALLERGIES:  diphenhydrAMINE (Unknown)  ibuprofen (Unknown)  penicillin (Unknown)  Prolixin (Unknown)      INTERVAL HPI/OVERNIGHT EVENTS:    VITALS:  T(F): 96.2 (07-07-18 @ 14:23), Max: 97.2 (07-06-18 @ 21:30)  HR: 76 (07-07-18 @ 14:23) (70 - 76)  BP: 156/59 (07-07-18 @ 14:23) (110/77 - 156/59)  RR: 16 (07-07-18 @ 14:23) (16 - 16)  SpO2: --    LABS:        MICROBIOLOGY:    MEDICATION:  atorvastatin 20 milliGRAM(s) Oral at bedtime  diVALproex  milliGRAM(s) Oral daily  levETIRAcetam 500 milliGRAM(s) Oral two times a day  mirtazapine 15 milliGRAM(s) Oral at bedtime  multivitamin 1 Tablet(s) Oral daily  sodium chloride 0.9% lock flush 3 milliLiter(s) IV Push every 8 hours  tamsulosin 0.4 milliGRAM(s) Oral at bedtime    RADIOLOGY & ADDITIONAL TESTS:

## 2018-07-08 RX ADMIN — TAMSULOSIN HYDROCHLORIDE 0.4 MILLIGRAM(S): 0.4 CAPSULE ORAL at 22:20

## 2018-07-08 RX ADMIN — ATORVASTATIN CALCIUM 20 MILLIGRAM(S): 80 TABLET, FILM COATED ORAL at 22:20

## 2018-07-08 RX ADMIN — LEVETIRACETAM 500 MILLIGRAM(S): 250 TABLET, FILM COATED ORAL at 17:02

## 2018-07-08 RX ADMIN — MIRTAZAPINE 15 MILLIGRAM(S): 45 TABLET, ORALLY DISINTEGRATING ORAL at 22:20

## 2018-07-08 RX ADMIN — Medication 1 TABLET(S): at 11:28

## 2018-07-08 RX ADMIN — DIVALPROEX SODIUM 500 MILLIGRAM(S): 500 TABLET, DELAYED RELEASE ORAL at 11:28

## 2018-07-08 RX ADMIN — LEVETIRACETAM 500 MILLIGRAM(S): 250 TABLET, FILM COATED ORAL at 05:49

## 2018-07-08 NOTE — PROGRESS NOTE ADULT - SUBJECTIVE AND OBJECTIVE BOX
ZEE VILLAGOMEZ  73y  Male    Patient is a 73y old  Male who presents with a chief complaint of altered mental status (04 Jul 2018 16:13)    ALLERGIES:  diphenhydrAMINE (Unknown)  ibuprofen (Unknown)  penicillin (Unknown)  Prolixin (Unknown)      INTERVAL HPI/OVERNIGHT EVENTS:    VITALS:  T(F): 97 (07-08-18 @ 06:01), Max: 97 (07-08-18 @ 06:01)  HR: 88 (07-08-18 @ 06:01) (76 - 88)  BP: 122/52 (07-08-18 @ 06:01) (122/52 - 163/80)  RR: 16 (07-08-18 @ 06:01) (16 - 16)  SpO2: --    LABS:        MICROBIOLOGY:    MEDICATION:  atorvastatin 20 milliGRAM(s) Oral at bedtime  diVALproex  milliGRAM(s) Oral daily  levETIRAcetam 500 milliGRAM(s) Oral two times a day  mirtazapine 15 milliGRAM(s) Oral at bedtime  multivitamin 1 Tablet(s) Oral daily  sodium chloride 0.9% lock flush 3 milliLiter(s) IV Push every 8 hours  tamsulosin 0.4 milliGRAM(s) Oral at bedtime    RADIOLOGY & ADDITIONAL TESTS:

## 2018-07-09 DIAGNOSIS — F20.9 SCHIZOPHRENIA, UNSPECIFIED: ICD-10-CM

## 2018-07-09 DIAGNOSIS — J44.9 CHRONIC OBSTRUCTIVE PULMONARY DISEASE, UNSPECIFIED: ICD-10-CM

## 2018-07-09 RX ADMIN — DIVALPROEX SODIUM 500 MILLIGRAM(S): 500 TABLET, DELAYED RELEASE ORAL at 12:56

## 2018-07-09 RX ADMIN — LEVETIRACETAM 500 MILLIGRAM(S): 250 TABLET, FILM COATED ORAL at 05:45

## 2018-07-09 RX ADMIN — Medication 1 TABLET(S): at 12:56

## 2018-07-09 RX ADMIN — ATORVASTATIN CALCIUM 20 MILLIGRAM(S): 80 TABLET, FILM COATED ORAL at 21:58

## 2018-07-09 RX ADMIN — SODIUM CHLORIDE 3 MILLILITER(S): 9 INJECTION INTRAMUSCULAR; INTRAVENOUS; SUBCUTANEOUS at 21:58

## 2018-07-09 RX ADMIN — TAMSULOSIN HYDROCHLORIDE 0.4 MILLIGRAM(S): 0.4 CAPSULE ORAL at 21:57

## 2018-07-09 RX ADMIN — LEVETIRACETAM 500 MILLIGRAM(S): 250 TABLET, FILM COATED ORAL at 17:13

## 2018-07-09 RX ADMIN — MIRTAZAPINE 15 MILLIGRAM(S): 45 TABLET, ORALLY DISINTEGRATING ORAL at 21:57

## 2018-07-09 NOTE — PROGRESS NOTE ADULT - SUBJECTIVE AND OBJECTIVE BOX
ZEE VILLAGOMEZ  73y  Male    Patient is a 73y old  Male who presents with a chief complaint of altered mental status (04 Jul 2018 16:13)    ALLERGIES:  diphenhydrAMINE (Unknown)  ibuprofen (Unknown)  penicillin (Unknown)  Prolixin (Unknown)      INTERVAL HPI/OVERNIGHT EVENTS:    VITALS:  T(F): 97.2 (07-09-18 @ 06:00), Max: 97.5 (07-08-18 @ 13:42)  HR: 86 (07-09-18 @ 06:00) (86 - 88)  BP: 156/72 (07-09-18 @ 06:00) (145/68 - 168/78)  RR: 18 (07-09-18 @ 06:00) (16 - 18)  SpO2: --    LABS:        MICROBIOLOGY:    MEDICATION:  atorvastatin 20 milliGRAM(s) Oral at bedtime  diVALproex  milliGRAM(s) Oral daily  levETIRAcetam 500 milliGRAM(s) Oral two times a day  mirtazapine 15 milliGRAM(s) Oral at bedtime  multivitamin 1 Tablet(s) Oral daily  tamsulosin 0.4 milliGRAM(s) Oral at bedtime    RADIOLOGY & ADDITIONAL TESTS:    	          CAPILLARY BLOOD GLUCOSE                Echo:    RADIOLOGY & ADDITIONAL TESTS:

## 2018-07-10 ENCOUNTER — TRANSCRIPTION ENCOUNTER (OUTPATIENT)
Age: 73
End: 2018-07-10

## 2018-07-10 VITALS
TEMPERATURE: 97 F | RESPIRATION RATE: 18 BRPM | SYSTOLIC BLOOD PRESSURE: 122 MMHG | HEART RATE: 82 BPM | DIASTOLIC BLOOD PRESSURE: 68 MMHG

## 2018-07-10 RX ORDER — HALOPERIDOL DECANOATE 100 MG/ML
1 INJECTION INTRAMUSCULAR DAILY
Qty: 0 | Refills: 0 | Status: DISCONTINUED | OUTPATIENT
Start: 2018-07-10 | End: 2018-07-10

## 2018-07-10 RX ADMIN — LEVETIRACETAM 500 MILLIGRAM(S): 250 TABLET, FILM COATED ORAL at 06:08

## 2018-07-10 RX ADMIN — DIVALPROEX SODIUM 500 MILLIGRAM(S): 500 TABLET, DELAYED RELEASE ORAL at 11:45

## 2018-07-10 RX ADMIN — Medication 1 TABLET(S): at 11:45

## 2018-07-10 RX ADMIN — SODIUM CHLORIDE 3 MILLILITER(S): 9 INJECTION INTRAMUSCULAR; INTRAVENOUS; SUBCUTANEOUS at 06:08

## 2018-07-10 RX ADMIN — HALOPERIDOL DECANOATE 1 MILLIGRAM(S): 100 INJECTION INTRAMUSCULAR at 11:45

## 2018-07-10 NOTE — CONSULT NOTE ADULT - ASSESSMENT
IMPRESSION: Rehab of ataxia, debility, schizophrenia, seizure disorder    PRECAUTIONS: [x  ] Cardiac  [  ] Respiratory  [ x ] Seizures [  ] Contact Isolation  [  ] Droplet Isolation  [  ] Other    Weight Bearing Status:     RECOMMENDATION:    Out of Bed to Chair     DVT/Decubiti Prophylaxis    REHAB PLAN:     [ x  ] Bedside P/T 3-5 times a week   [   ]   Bedside O/T  2-3 times a week             [   ] No Rehab Therapy Indicated                   [   ]  Speech Therapy   Conditioning/ROM                                    ADL  Bed Mobility                                               Conditioning/ROM  Transfers                                                     Bed Mobility  Sitting /Standing Balance                         Transfers                                        Gait Training                                               Sitting/Standing Balance  Stair Training [   ]Applicable                    Home equipment Eval                                                                        Splinting  [   ] Only      GOALS:   ADL   [   ]   Independent                    Transfers  [ x  ] Independent                          Ambulation  [  x ] Independent     [ x   ] With device                            [ x  ]  CG                                                         [   ]  CG                                                                  [   ] CG                            [    ] Min A                                                   [   ] Min A                                                              [   ] Min  A          DISCHARGE PLAN:   [   ]  Good candidate for Intensive Rehabilitation/Hospital based-[facility]A [facility]                                             Will tolerate 3hrs Intensive Rehab Daily                                       [  xx  ]  Short Term Rehab in Skilled Nursing Facility                                       [    ]  Home with Outpatient or VN services                                         [    ]  Possible Candidate for Intensive Hospital based Rehab

## 2018-07-10 NOTE — PROGRESS NOTE ADULT - SUBJECTIVE AND OBJECTIVE BOX
ZEE VILLAGOMEZ  73y  Male    Patient is a 73y old  Male who presents with a chief complaint of altered mental status (04 Jul 2018 16:13)    ALLERGIES:  diphenhydrAMINE (Unknown)  ibuprofen (Unknown)  penicillin (Unknown)  Prolixin (Unknown)      INTERVAL HPI/OVERNIGHT EVENTS:    VITALS:  T(F): 96 (07-10-18 @ 06:00), Max: 96.8 (07-09-18 @ 22:10)  HR: 80 (07-10-18 @ 06:00) (73 - 80)  BP: 108/60 (07-10-18 @ 06:00) (108/60 - 128/60)  RR: 18 (07-10-18 @ 06:00) (16 - 18)  SpO2: --    LABS:        MICROBIOLOGY:    MEDICATION:    RADIOLOGY & ADDITIONAL TESTS:

## 2018-07-10 NOTE — DISCHARGE NOTE ADULT - CARE PROVIDER_API CALL
Konstantin Wadsworth), Internal Medicine  [address] [place] [address]  Phone: [phone]  Fax: [phone]

## 2018-07-10 NOTE — DISCHARGE NOTE ADULT - HOSPITAL COURSE
AS PER PMD 73y old male pmhx below presents to the ED as per adult home for sudden onset of change in mental status. pt was found awake all night, walking about and confused. pt is confused unable to get further history.  patient was admitted w diagnosis of schizophrenia .psychiatry consultation done  patient on Haldol     patient discharged to long term

## 2018-07-10 NOTE — DISCHARGE NOTE ADULT - CARE PLAN
Principal Discharge DX:	Altered mental state  Goal:	resolution to baseline  Assessment and plan of treatment:	neurology fup

## 2018-07-10 NOTE — CONSULT NOTE ADULT - SUBJECTIVE AND OBJECTIVE BOX
HPI:  73y old male pmhx below presents to the ED as per adult home for sudden onset of change in mental status. pt was found awake all night, walking about and confused. pt is confused unable to get further history. (04 Jul 2018 16:13)      PAST MEDICAL & SURGICAL HISTORY:  COPD (chronic obstructive pulmonary disease)  Schizophrenia  Asthma  Seizure  Glaucoma  Overactive bladder  BPH (benign prostatic hyperplasia)  Constipation  Hypertension  No significant past surgical history      Hospital Course:  He has schizophrenia. he is on Haldol 5 mg po qam normally at [facility] per sheet. he has some slurring. He had a CT of the head which is ok. he has a history of a seizure d/o and is on keppra.   TODAY'S SUBJECTIVE & REVIEW OF SYMPTOMS:     Constitutional WNL   Cardio WNL   Resp WNL   GI WNL  Heme WNL  Endo WNL  Skin WNL  MSK WNL  Neuro seizure d/o  Cognitive WNL  Psych schizophrenia      MEDICATIONS  (STANDING):  atorvastatin 20 milliGRAM(s) Oral at bedtime  diVALproex  milliGRAM(s) Oral daily  haloperidol     Tablet 1 milliGRAM(s) Oral daily  levETIRAcetam 500 milliGRAM(s) Oral two times a day  mirtazapine 15 milliGRAM(s) Oral at bedtime  multivitamin 1 Tablet(s) Oral daily  sodium chloride 0.9% lock flush 3 milliLiter(s) IV Push every 8 hours  tamsulosin 0.4 milliGRAM(s) Oral at bedtime    MEDICATIONS  (PRN):      FAMILY HISTORY:  No pertinent family history in first degree relatives      Allergies    diphenhydrAMINE (Unknown)  ibuprofen (Unknown)  penicillin (Unknown)  Prolixin (Unknown)    Intolerances        SOCIAL HISTORY:    [  ] Etoh  [  ] Smoking  [  ] Substance abuse     Home Environment:  [  ] Home Alone  [  ] Lives with Family  [  ] Home Health Aid    Dwelling:  [  ] Apartment  [  ] Private House  [  ] Adult Home  [  ] Skilled Nursing Facility      [  ] Short Term  [  ] Long Term  [  ] Stairs       Elevator [  ]    FUNCTIONAL STATUS PTA: (Check all that apply)  Ambulation: [   ]Independent    [  ] Dependent     [  ] Non-Ambulatory  Assistive Device: [  ] SA Cane  [  ]  Q Cane  [  ] Walker  [  ]  Wheelchair  ADL : [  ] Independent  [  ]  Dependent       Vital Signs Last 24 Hrs  T(C): 35.6 (10 Jul 2018 06:00), Max: 36 (09 Jul 2018 22:10)  T(F): 96 (10 Jul 2018 06:00), Max: 96.8 (09 Jul 2018 22:10)  HR: 80 (10 Jul 2018 06:00) (73 - 80)  BP: 108/60 (10 Jul 2018 06:00) (108/60 - 128/60)  BP(mean): --  RR: 18 (10 Jul 2018 06:00) (16 - 18)  SpO2: --      PHYSICAL EXAM: Alert & Oriented X1  GENERAL: NAD, well-groomed, well-developed  HEAD:  Atraumatic, Normocephalic  EYES: EOMI, PERRLA, conjunctiva and sclera clear  NECK: Supple, No JVD, Normal thyroid  CHEST/LUNG: Clear to percussion bilaterally; No rales, rhonchi, wheezing, or rubs  HEART: Regular rate and rhythm; No murmurs, rubs, or gallops  ABDOMEN: Soft, Nontender, Nondistended; Bowel sounds present  EXTREMITIES:  2+ Peripheral Pulses, No clubbing, cyanosis, or edema    NERVOUS SYSTEM:  Cranial Nerves 2-12 intact [  ] Abnormal  [x  ] cr. n 2-12 intact, speech appears slurred  ROM: WFL all extremities [  ]  Abnormal [  ]  Motor Strength: WFL all extremities  [  ]  Abnormal [ x ]5- to 5/5 in 4 extrem.  Sensation: intact to light touch [x  ] Abnormal [  ]  Reflexes: Symmetric [  ]  Abnormal [  ]    FUNCTIONAL STATUS:  Bed Mobility: Independent [  ]  Supervision [  ]  Needs Assistance [  ]  N/A [  ]  Transfers: Independent [  ]  Supervision [  ]  Needs Assistance [  ]  N/A [  ]   Ambulation: Independent [  ]  Supervision [  ]  Needs Assistance [  ]  N/A [  ]  ADL: Independent [  ] Requires Assistance [  ] N/A [  ]      LABS:                RADIOLOGY & ADDITIONAL STUDIES:    Assesment:

## 2018-07-10 NOTE — PHYSICAL THERAPY INITIAL EVALUATION ADULT - IMPAIRMENTS CONTRIBUTING TO GAIT DEVIATIONS, PT EVAL
decreased endurance/decreased strength/impaired postural control/impaired balance/narrow base of support

## 2018-07-10 NOTE — DISCHARGE NOTE ADULT - PATIENT PORTAL LINK FT
You can access the [facility], offered by [facility], by registering with the following website: [email or web address]

## 2018-07-10 NOTE — DISCHARGE NOTE ADULT - MEDICATION SUMMARY - MEDICATIONS TO TAKE
I will START or STAY ON the medications listed below when I get home from the hospital:    naproxen 375 mg oral tablet  -- 1 tab(s) by mouth 2 times a day  -- Indication: For Cns    Flomax 0.4 mg oral capsule  -- 1 cap(s) by mouth once a day  -- Indication: For BPH (benign prostatic hyperplasia)    Depakote 500 mg oral delayed release tablet  -- Indication: For Seizure    Keppra 500 mg oral tablet  -- 1 tab(s) by mouth 2 times a day  -- Indication: For Seizure    Remeron 15 mg oral tablet  -- 1 tab(s) by mouth once a day (at bedtime)  -- Indication: For Paranoid schizophrenia    Lipitor 20 mg oral tablet  -- 1 tab(s) by mouth once a day  -- Indication: For High cholesterol    Haldol 5 mg oral tablet  -- Indication: For Paranoid schizophrenia    Multiple Vitamins oral tablet  -- 1 tab(s) by mouth once a day  -- Indication: For Supplement

## 2018-07-10 NOTE — PHYSICAL THERAPY INITIAL EVALUATION ADULT - GENERAL OBSERVATIONS, REHAB EVAL
09:30-09:58 Chart reviewed. Pt encountered semireclined in bed,  may be seen by Physical Therapist as confirmed with Nurse. Patient denied pain and agreed to walk

## 2018-07-10 NOTE — PHYSICAL THERAPY INITIAL EVALUATION ADULT - WEIGHT-BEARING RESTRICTIONS: GAIT, REHAB EVAL
Include Z78.9 (Other Specified Conditions Influencing Health Status) As An Associated Diagnosis?: No Post-Care Instructions: I reviewed with the patient in detail post-care instructions. Patient is to wear sunprotection, and avoid picking at any of the treated lesions. Pt may apply Vaseline to crusted or scabbing areas. Consent: The patient's consent was obtained including but not limited to risks of crusting, scabbing, blistering, scarring, darker or lighter pigmentary change, recurrence, incomplete removal and infection. Medical Necessity Clause: This procedure was medically necessary because the lesions that were treated were: Medical Necessity Information: It is in your best interest to select a reason for this procedure from the list below. All of these items fulfill various CMS LCD requirements except the new and changing color options. Detail Level: Simple full weight-bearing

## 2018-07-20 DIAGNOSIS — R53.81 OTHER MALAISE: ICD-10-CM

## 2018-07-20 DIAGNOSIS — J45.909 UNSPECIFIED ASTHMA, UNCOMPLICATED: ICD-10-CM

## 2018-07-20 DIAGNOSIS — J44.9 CHRONIC OBSTRUCTIVE PULMONARY DISEASE, UNSPECIFIED: ICD-10-CM

## 2018-07-20 DIAGNOSIS — I10 ESSENTIAL (PRIMARY) HYPERTENSION: ICD-10-CM

## 2018-07-20 DIAGNOSIS — G40.909 EPILEPSY, UNSPECIFIED, NOT INTRACTABLE, WITHOUT STATUS EPILEPTICUS: ICD-10-CM

## 2018-07-20 DIAGNOSIS — R26.9 UNSPECIFIED ABNORMALITIES OF GAIT AND MOBILITY: ICD-10-CM

## 2018-07-20 DIAGNOSIS — F20.0 PARANOID SCHIZOPHRENIA: ICD-10-CM

## 2018-07-20 DIAGNOSIS — H40.9 UNSPECIFIED GLAUCOMA: ICD-10-CM

## 2018-07-20 DIAGNOSIS — N32.81 OVERACTIVE BLADDER: ICD-10-CM

## 2018-07-20 DIAGNOSIS — Z88.0 ALLERGY STATUS TO PENICILLIN: ICD-10-CM

## 2018-07-20 DIAGNOSIS — N40.0 BENIGN PROSTATIC HYPERPLASIA WITHOUT LOWER URINARY TRACT SYMPTOMS: ICD-10-CM

## 2018-07-20 DIAGNOSIS — Z88.8 ALLERGY STATUS TO OTHER DRUGS, MEDICAMENTS AND BIOLOGICAL SUBSTANCES STATUS: ICD-10-CM

## 2018-07-20 DIAGNOSIS — Z88.6 ALLERGY STATUS TO ANALGESIC AGENT: ICD-10-CM

## 2018-07-20 DIAGNOSIS — R41.0 DISORIENTATION, UNSPECIFIED: ICD-10-CM

## 2018-07-20 DIAGNOSIS — R41.82 ALTERED MENTAL STATUS, UNSPECIFIED: ICD-10-CM

## 2018-07-20 DIAGNOSIS — E53.8 DEFICIENCY OF OTHER SPECIFIED B GROUP VITAMINS: ICD-10-CM

## 2018-07-20 DIAGNOSIS — K59.00 CONSTIPATION, UNSPECIFIED: ICD-10-CM

## 2020-03-04 NOTE — ED PROVIDER NOTE - OBJECTIVE STATEMENT
Patient states she needs a call back to get a Hospital F/U appt asap. Patient states she had surgery done & is having issues with a Bad Cough/chest congestion. Please call.  Thank you Sent from adult home. patient up all night walking naked, confused.

## 2020-11-27 NOTE — ED PROVIDER NOTE - CARE PLAN
[Time Spent: ___ minutes] : I have spent [unfilled] minutes of time on the encounter. [>50% of the face to face encounter time was spent on counseling and/or coordination of care for ___] : Greater than 50% of the face to face encounter time was spent on counseling and/or coordination of care for [unfilled] Principal Discharge DX:	Altered mental state  Secondary Diagnosis:	Delirium  Secondary Diagnosis:	Schizophrenia

## 2022-05-18 NOTE — PATIENT PROFILE ADULT. - CURRENT SWALLOWING
not appropriate Nsaids Pregnancy And Lactation Text: These medications are considered safe up to 30 weeks gestation. It is excreted in breast milk.

## 2023-11-01 NOTE — PROGRESS NOTE ADULT - SUBJECTIVE AND OBJECTIVE BOX
Neurology Follow up note    Name  ZEE VILLAGOMEZ    HPI:  73y old male pmhx below presents to the ED as per adult home for sudden onset of change in mental status. pt was found awake all night, walking about and confused. pt is confused unable to get further history. (04 Jul 2018 16:13)      Interval History:    no change in patient status or exam  reeg pending for today      Vital Signs Last 24 Hrs  T(C): 35.8 (06 Jul 2018 05:36), Max: 35.8 (06 Jul 2018 05:36)  T(F): 96.5 (06 Jul 2018 05:36), Max: 96.5 (06 Jul 2018 05:36)  HR: 65 (06 Jul 2018 05:36) (65 - 80)  BP: 144/70 (06 Jul 2018 05:36) (129/60 - 146/72)  BP(mean): --  RR: 16 (06 Jul 2018 05:36) (16 - 16)  SpO2: --    Neurological Exam:   ms: awake and alert, oriented to person only  speech is unintelligible/dysarthric at times but is able make fluent sentences and follows simple commands. the content of his speech is altered. when i stop engaging him, he continues to speak to himself. at times the content is relevant and others it is not. appears to have appropriate prosody  no insight into admission  moves all 4 extrem equally  eyes midline, pupils sluggish  cr nn grossly symmetric      Medications  atorvastatin 20 milliGRAM(s) Oral at bedtime  diVALproex  milliGRAM(s) Oral daily  levETIRAcetam 500 milliGRAM(s) Oral two times a day  mirtazapine 15 milliGRAM(s) Oral at bedtime  multivitamin 1 Tablet(s) Oral daily  sodium chloride 0.9% lock flush 3 milliLiter(s) IV Push every 8 hours  tamsulosin 0.4 milliGRAM(s) Oral at bedtime      Lab  07-05    141  |  101  |  19  ----------------------------<  79  4.7   |  23  |  0.9    Ca    9.4      05 Jul 2018 07:00                            14.1   5.71  )-----------( 213      ( 05 Jul 2018 07:00 )             43.2               Radiology      Assessment: patient is a 72 yo man with acute ams  hx of schizophrenia and sz d/o  ams is acute      Plan:  REEG PENDING FOR TODAY  CHECK VPA LEVEL  CONT KEPPRA AT CURRENT DOSAGE, WILL FOLLOW None

## 2023-11-18 NOTE — ED PROVIDER NOTE - NS ED ROS FT
Constitutional: (-) fever  Skin:  no rash  Muskuloskeletal: right thigh pain  Neurological: (-) altered mental status .

## 2023-12-05 NOTE — ED PROVIDER NOTE - NSCAREINITIATED _GEN_ER
Continue APAP 10-15 cm  Replacement machine will be ordered. Order will be sent to [address] [place] [address] overnight oximetry on pap therapy    Overnight Oximetry is a device with a watch and a finger probe that you wear when you sleep. It measures your heart rate and the amount of oxygen in your blood. This test records the data, return the device back to your medical equipment company, and the data is sent to our office for review. You will be called with the results. The company who will be taking care of your CPAP supplies is:     Address: [address] [place] [address] [address], [address]  Phone: [phone] Option #1  Fax: [phone]
Ro Handley)

## 2025-01-16 NOTE — DISCHARGE NOTE ADULT - FUNCTIONAL STATUS DATE
"Subjective:      Patient ID: Gama Huertas is a 71 y.o. male.    Chief Complaint: Follow-up      Vitals:    01/16/25 1202   BP: (!) 160/70   Pulse: 67   Temp: 97.9 °F (36.6 °C)   TempSrc: Oral   SpO2: 95%   Weight: 57.2 kg (125 lb 15.9 oz)   Height: 5' 6" (1.676 m)        HPI   Check up; here with roommate/house owner he rents from  Still anemic  Problem List  Patient Active Problem List   Diagnosis    Microcytic anemia    Hypertension    Anxiety    History of colon polyps    PSA elevation    Other nonthrombocytopenic purpura    Dementia without behavioral disturbance    Smokes    Alcohol abuse    Adjustment disorder with mixed anxiety and depressed mood    Alcohol use disorder, severe, dependence    Alcohol withdrawal syndrome without complication    Disorders of glucose transport, unspecified    Aortic atherosclerosis    Pulmonary emphysema, unspecified emphysema type    Syncope    History of noncompliance with medical treatment, presenting hazards to health    Elevated brain natriuretic peptide (BNP) level        ALLERGIES: Review of patient's allergies indicates:  No Known Allergies    MEDS:   Current Outpatient Medications:     amLODIPine (NORVASC) 10 MG tablet, TAKE 1 TABLET BY MOUTH ONCE A DAY, Disp: 90 tablet, Rfl: 3    COSOPT 22.3-6.8 mg/mL ophthalmic solution, Place 1 drop into both eyes 2 (two) times daily., Disp: , Rfl:     EScitalopram oxalate (LEXAPRO) 5 MG Tab, TAKE 1 TABLET BY MOUTH ONCE A DAY, Disp: 90 tablet, Rfl: 0    folic acid (FOLVITE) 1 MG tablet, TAKE 1 TABLET BY MOUTH ONCE A DAY, Disp: 100 tablet, Rfl: 11    gabapentin (NEURONTIN) 300 MG capsule, TAKE 1 CAPSULE BY MOUTH TWO TIMES A DAY, Disp: 180 capsule, Rfl: 1    latanoprost 0.005 % ophthalmic solution, Place 1 drop into both eyes every evening., Disp: , Rfl:     losartan (COZAAR) 25 MG tablet, Take 1 tablet (25 mg total) by mouth once daily., Disp: 90 tablet, Rfl: 3    metoprolol tartrate (LOPRESSOR) 50 MG tablet, TAKE 1 TABLET BY " "MOUTH TWO TIMES A DAY, Disp: 180 tablet, Rfl: 3    pantoprazole (PROTONIX) 40 MG tablet, TAKE 1 TABLET BY MOUTH EVERY DAY, Disp: 90 tablet, Rfl: 3    ferrous sulfate (FEROSUL) 325 mg (65 mg iron) Tab tablet, Take 1 tablet (325 mg total) by mouth once daily., Disp: 90 tablet, Rfl: 3    nicotine polacrilex (NICORETTE) 4 MG Gum, Take 1 each (4 mg total) by mouth as needed. (Patient not taking: Reported on 1/16/2025), Disp: 200 each, Rfl: 11      History:  Current Providers as of 1/16/2025  PCP: Richard Meléndez MD  Care Team Provider: Les Wakefield Jr., MD  Care Team Provider: Anil Lester LPN  Care Team Provider: Vince Vu LPN  Care Team Provider: Marielena Green LPN  Care Team Provider: Jazz Sanon  Encounter Provider: Richard Meléndez MD, starting on Thu Jan 16, 2025 12:00 AM  Referring Provider: not found, starting on Thu Jan 16, 2025 12:00 AM  Consulting Physician: Richard Meléndez MD, starting on Thu Oct 10, 2024  2:27 PM, ending on Thu Jan 16, 2025 12:42 PM (Inactive)   Past Medical History:   Diagnosis Date    Gastric ulcer     Hypertension      Past Surgical History:   Procedure Laterality Date    COLONOSCOPY  3/11/14     repeat in 3 years dr wakefield     Social History     Tobacco Use    Smoking status: Every Day     Current packs/day: 0.50     Types: Cigarettes     Passive exposure: Current    Smokeless tobacco: Never   Substance Use Topics    Alcohol use: Yes     Alcohol/week: 35.0 standard drinks of alcohol     Types: 35 Cans of beer per week     Comment: Pt was vague and reported "celebrating too much maybe"    Drug use: Never         Review of Systems   Constitutional:  Negative for appetite change, fatigue, fever and unexpected weight change.   HENT:  Negative for congestion, ear pain, sinus pressure and sore throat.    Eyes:  Negative for pain and visual disturbance.   Respiratory:  Negative for shortness of breath.    Cardiovascular:  Negative for chest pain.   Gastrointestinal: "  Negative for abdominal pain, constipation and diarrhea.   Endocrine: Negative for polyuria.   Genitourinary:  Negative for difficulty urinating and frequency.   Musculoskeletal:  Negative for arthralgias, back pain and myalgias.   Skin:  Negative for color change.   Allergic/Immunologic: Negative.    Neurological:  Negative for syncope, weakness and headaches.   Hematological:  Does not bruise/bleed easily.   Psychiatric/Behavioral:  Negative for dysphoric mood and suicidal ideas. The patient is not nervous/anxious.    All other systems reviewed and are negative.    Objective:     Physical Exam  Vitals and nursing note reviewed.   Constitutional:       General: He is not in acute distress.     Appearance: Normal appearance. He is well-developed. He is not diaphoretic.   HENT:      Head: Normocephalic and atraumatic.      Right Ear: External ear normal.      Left Ear: External ear normal.      Mouth/Throat:      Pharynx: No oropharyngeal exudate.   Eyes:      General: No scleral icterus.        Right eye: No discharge.         Left eye: No discharge.      Conjunctiva/sclera: Conjunctivae normal.      Pupils: Pupils are equal, round, and reactive to light.   Neck:      Thyroid: No thyromegaly.      Vascular: No JVD.      Trachea: No tracheal deviation.   Cardiovascular:      Rate and Rhythm: Normal rate and regular rhythm.      Heart sounds: No murmur heard.     No friction rub. No gallop.   Pulmonary:      Effort: Pulmonary effort is normal. No respiratory distress.      Breath sounds: Normal breath sounds. No stridor. No wheezing or rales.   Chest:      Chest wall: No tenderness.   Abdominal:      General: There is no distension.      Palpations: Abdomen is soft. There is no mass.      Tenderness: There is no abdominal tenderness. There is no guarding or rebound.   Musculoskeletal:         General: No tenderness. Normal range of motion.      Cervical back: Normal range of motion and neck supple.   Lymphadenopathy:       Cervical: No cervical adenopathy.   Skin:     General: Skin is warm and dry.      Coloration: Skin is not pale.      Findings: No erythema or rash.   Neurological:      General: No focal deficit present.      Mental Status: He is alert and oriented to person, place, and time. Mental status is at baseline.      Cranial Nerves: No cranial nerve deficit.      Motor: No abnormal muscle tone.      Coordination: Coordination normal.      Deep Tendon Reflexes: Reflexes are normal and symmetric. Reflexes normal.   Psychiatric:         Mood and Affect: Mood normal.         Behavior: Behavior normal.         Thought Content: Thought content normal.         Judgment: Judgment normal.             Assessment:     1. Chronic gastric ulcer with hemorrhage    2. PSA elevation    3. Essential hypertension    4. Microcytic anemia    5. Wellness examination    6. Anxiety    7. Alcohol abuse    8. Pulmonary emphysema, unspecified emphysema type    9. Aortic atherosclerosis    10. Primary hypertension    11. Disorders of glucose transport, unspecified    12. Smokes    13. Other nonthrombocytopenic purpura    14. Dementia without behavioral disturbance    15. Encounter for screening for malignant neoplasm of prostate    16. Disorder of cartilage, unspecified    17. Personal history of nicotine dependence    18. Alcohol use disorder, severe, dependence    19. Iron deficiency anemia due to chronic blood loss    20. Colon cancer screening      Plan:        Medication List            Accurate as of January 16, 2025 11:59 PM. If you have any questions, ask your nurse or doctor.                CONTINUE taking these medications      amLODIPine 10 MG tablet  Commonly known as: NORVASC  TAKE 1 TABLET BY MOUTH ONCE A DAY     COSOPT 22.3-6.8 mg/mL ophthalmic solution  Generic drug: dorzolamide-timolol 2-0.5%     EScitalopram oxalate 5 MG Tab  Commonly known as: LEXAPRO  TAKE 1 TABLET BY MOUTH ONCE A DAY     ferrous sulfate 325 mg (65 mg iron)  Tab tablet  Commonly known as: FeroSuL  Take 1 tablet (325 mg total) by mouth once daily.     folic acid 1 MG tablet  Commonly known as: FOLVITE  TAKE 1 TABLET BY MOUTH ONCE A DAY     gabapentin 300 MG capsule  Commonly known as: NEURONTIN  TAKE 1 CAPSULE BY MOUTH TWO TIMES A DAY     latanoprost 0.005 % ophthalmic solution     losartan 25 MG tablet  Commonly known as: COZAAR  Take 1 tablet (25 mg total) by mouth once daily.     metoprolol tartrate 50 MG tablet  Commonly known as: LOPRESSOR  TAKE 1 TABLET BY MOUTH TWO TIMES A DAY     nicotine polacrilex 4 MG Gum  Commonly known as: NICORETTE  Take 1 each (4 mg total) by mouth as needed.     pantoprazole 40 MG tablet  Commonly known as: PROTONIX  TAKE 1 TABLET BY MOUTH EVERY DAY               Where to Get Your Medications        These medications were sent to [id number] [address] [address]      Phone: [phone]   ferrous sulfate 325 mg (65 mg iron) Tab tablet       Chronic gastric ulcer with hemorrhage  -     CBC Auto Differential; Future; Expected date: 01/16/2025  -     Comprehensive Metabolic Panel; Future; Expected date: 01/16/2025  -     Hemoglobin A1C; Future  -     Lipid Panel; Future  -     PSA, Screening; Future  -     TSH; Future  -     Urinalysis, Reflex to Urine Culture Urine, Clean Catch; Future; Expected date: 01/16/2025  -     Vitamin D; Future  -     CT Chest Lung Screening Low Dose; Future; Expected date: 01/16/2025    PSA elevation  -     CBC Auto Differential; Future; Expected date: 01/16/2025  -     Comprehensive Metabolic Panel; Future; Expected date: 01/16/2025  -     Hemoglobin A1C; Future  -     Lipid Panel; Future  -     PSA, Screening; Future  -     TSH; Future  -     Urinalysis, Reflex to Urine Culture Urine, Clean Catch; Future; Expected date: 01/16/2025  -     Vitamin D; Future  -     CT Chest Lung Screening Low Dose; Future; Expected date: 01/16/2025    Essential  hypertension  -     CBC Auto Differential; Future; Expected date: 01/16/2025  -     Comprehensive Metabolic Panel; Future; Expected date: 01/16/2025  -     Hemoglobin A1C; Future  -     Lipid Panel; Future  -     PSA, Screening; Future  -     TSH; Future  -     Urinalysis, Reflex to Urine Culture Urine, Clean Catch; Future; Expected date: 01/16/2025  -     Vitamin D; Future  -     CT Chest Lung Screening Low Dose; Future; Expected date: 01/16/2025    Microcytic anemia  -     CBC Auto Differential; Future; Expected date: 01/16/2025  -     Comprehensive Metabolic Panel; Future; Expected date: 01/16/2025  -     Hemoglobin A1C; Future  -     Lipid Panel; Future  -     PSA, Screening; Future  -     TSH; Future  -     Urinalysis, Reflex to Urine Culture Urine, Clean Catch; Future; Expected date: 01/16/2025  -     Vitamin D; Future  -     CT Chest Lung Screening Low Dose; Future; Expected date: 01/16/2025    Wellness examination  -     CBC Auto Differential; Future; Expected date: 01/16/2025  -     Comprehensive Metabolic Panel; Future; Expected date: 01/16/2025  -     Hemoglobin A1C; Future  -     Lipid Panel; Future  -     PSA, Screening; Future  -     TSH; Future  -     Urinalysis, Reflex to Urine Culture Urine, Clean Catch; Future; Expected date: 01/16/2025  -     Vitamin D; Future  -     CT Chest Lung Screening Low Dose; Future; Expected date: 01/16/2025    Anxiety  -     CBC Auto Differential; Future; Expected date: 01/16/2025  -     Comprehensive Metabolic Panel; Future; Expected date: 01/16/2025  -     Hemoglobin A1C; Future  -     Lipid Panel; Future  -     PSA, Screening; Future  -     TSH; Future  -     Urinalysis, Reflex to Urine Culture Urine, Clean Catch; Future; Expected date: 01/16/2025  -     Vitamin D; Future  -     CT Chest Lung Screening Low Dose; Future; Expected date: 01/16/2025    Alcohol abuse  -     CBC Auto Differential; Future; Expected date: 01/16/2025  -     Comprehensive Metabolic Panel;  Future; Expected date: 01/16/2025  -     Hemoglobin A1C; Future  -     Lipid Panel; Future  -     PSA, Screening; Future  -     TSH; Future  -     Urinalysis, Reflex to Urine Culture Urine, Clean Catch; Future; Expected date: 01/16/2025  -     Vitamin D; Future  -     CT Chest Lung Screening Low Dose; Future; Expected date: 01/16/2025    Pulmonary emphysema, unspecified emphysema type  -     CBC Auto Differential; Future; Expected date: 01/16/2025  -     Comprehensive Metabolic Panel; Future; Expected date: 01/16/2025  -     Hemoglobin A1C; Future  -     Lipid Panel; Future  -     PSA, Screening; Future  -     TSH; Future  -     Urinalysis, Reflex to Urine Culture Urine, Clean Catch; Future; Expected date: 01/16/2025  -     Vitamin D; Future  -     CT Chest Lung Screening Low Dose; Future; Expected date: 01/16/2025    Aortic atherosclerosis  -     CBC Auto Differential; Future; Expected date: 01/16/2025  -     Comprehensive Metabolic Panel; Future; Expected date: 01/16/2025  -     Hemoglobin A1C; Future  -     Lipid Panel; Future  -     PSA, Screening; Future  -     TSH; Future  -     Urinalysis, Reflex to Urine Culture Urine, Clean Catch; Future; Expected date: 01/16/2025  -     Vitamin D; Future  -     CT Chest Lung Screening Low Dose; Future; Expected date: 01/16/2025    Primary hypertension  -     CBC Auto Differential; Future; Expected date: 01/16/2025  -     Comprehensive Metabolic Panel; Future; Expected date: 01/16/2025  -     Hemoglobin A1C; Future  -     Lipid Panel; Future  -     PSA, Screening; Future  -     TSH; Future  -     Urinalysis, Reflex to Urine Culture Urine, Clean Catch; Future; Expected date: 01/16/2025  -     Vitamin D; Future  -     CT Chest Lung Screening Low Dose; Future; Expected date: 01/16/2025    Disorders of glucose transport, unspecified  -     CBC Auto Differential; Future; Expected date: 01/16/2025  -     Comprehensive Metabolic Panel; Future; Expected date: 01/16/2025  -      Hemoglobin A1C; Future  -     Lipid Panel; Future  -     PSA, Screening; Future  -     TSH; Future  -     Urinalysis, Reflex to Urine Culture Urine, Clean Catch; Future; Expected date: 01/16/2025  -     Vitamin D; Future  -     CT Chest Lung Screening Low Dose; Future; Expected date: 01/16/2025    Smokes  -     CBC Auto Differential; Future; Expected date: 01/16/2025  -     Comprehensive Metabolic Panel; Future; Expected date: 01/16/2025  -     Hemoglobin A1C; Future  -     Lipid Panel; Future  -     PSA, Screening; Future  -     TSH; Future  -     Urinalysis, Reflex to Urine Culture Urine, Clean Catch; Future; Expected date: 01/16/2025  -     Vitamin D; Future  -     CT Chest Lung Screening Low Dose; Future; Expected date: 01/16/2025    Other nonthrombocytopenic purpura  -     CBC Auto Differential; Future; Expected date: 01/16/2025  -     Comprehensive Metabolic Panel; Future; Expected date: 01/16/2025  -     Hemoglobin A1C; Future  -     Lipid Panel; Future  -     PSA, Screening; Future  -     TSH; Future  -     Urinalysis, Reflex to Urine Culture Urine, Clean Catch; Future; Expected date: 01/16/2025  -     Vitamin D; Future  -     CT Chest Lung Screening Low Dose; Future; Expected date: 01/16/2025    Dementia without behavioral disturbance  -     CBC Auto Differential; Future; Expected date: 01/16/2025  -     Comprehensive Metabolic Panel; Future; Expected date: 01/16/2025  -     Hemoglobin A1C; Future  -     Lipid Panel; Future  -     PSA, Screening; Future  -     TSH; Future  -     Urinalysis, Reflex to Urine Culture Urine, Clean Catch; Future; Expected date: 01/16/2025  -     Vitamin D; Future  -     CT Chest Lung Screening Low Dose; Future; Expected date: 01/16/2025    Encounter for screening for malignant neoplasm of prostate  -     PSA, Screening; Future    Disorder of cartilage, unspecified  -     Vitamin D; Future    Personal history of nicotine dependence  -     CT Chest Lung Screening Low Dose; Future;  10-Jul-2018 Expected date: 01/16/2025    Alcohol use disorder, severe, dependence    Iron deficiency anemia due to chronic blood loss  -     ferrous sulfate (FEROSUL) 325 mg (65 mg iron) Tab tablet; Take 1 tablet (325 mg total) by mouth once daily.  Dispense: 90 tablet; Refill: 3    Colon cancer screening  -     Cologuard Screening (Multitarget Stool DNA); Future; Expected date: 01/16/2025